# Patient Record
Sex: MALE | Race: WHITE | NOT HISPANIC OR LATINO | Employment: STUDENT | URBAN - METROPOLITAN AREA
[De-identification: names, ages, dates, MRNs, and addresses within clinical notes are randomized per-mention and may not be internally consistent; named-entity substitution may affect disease eponyms.]

---
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
## 2024-03-26 ENCOUNTER — OFFICE VISIT (OUTPATIENT)
Dept: URGENT CARE | Facility: CLINIC | Age: 19
End: 2024-03-26
Payer: COMMERCIAL

## 2024-03-26 VITALS
WEIGHT: 139 LBS | BODY MASS INDEX: 19.46 KG/M2 | OXYGEN SATURATION: 98 % | RESPIRATION RATE: 18 BRPM | DIASTOLIC BLOOD PRESSURE: 52 MMHG | SYSTOLIC BLOOD PRESSURE: 98 MMHG | HEART RATE: 107 BPM | TEMPERATURE: 102 F | HEIGHT: 71 IN

## 2024-03-26 DIAGNOSIS — B34.9 VIRAL INFECTION: Primary | ICD-10-CM

## 2024-03-26 PROCEDURE — 99213 OFFICE O/P EST LOW 20 MIN: CPT

## 2024-03-26 NOTE — PROGRESS NOTES
[facility]        NAME: Chance Lennon is a 18 y.o. male  : 2005    MRN: [id number]  DATE: 2024  TIME: 5:23 PM    Assessment and Plan   Viral infection [B34.9]  1. Viral infection              Patient Instructions       Follow up with PCP in 3-5 days.  Proceed to  ER if symptoms worsen.    If tests have been performed at [facility], our office will contact you with results if changes need to be made to the care plan discussed with you at the visit.  [facility]    Chief Complaint     Chief Complaint   Patient presents with    Fever     Pt presents with fever (peak 104), sore throat, body aches, chills, nasal congestion, and ear pressure since waking up this patsy.    Pt took tylenol today x 45 minutes ago.           History of Present Illness       19 y/o M presents with mom for cold-like symptoms x 2 days.  Patient was last night a sore throat developed.  This morning he woke up with myalgias, headache, sinus pressure, fevers and chills.  Using Tylenol as needed.  Last use 45 minutes ago.  Denies any known sick contacts.        Review of Systems   Review of Systems   Constitutional:  Positive for chills and fever.   HENT:  Positive for congestion, ear pain, rhinorrhea and sore throat.    Respiratory:  Negative for cough.    Gastrointestinal:  Negative for abdominal pain.   Neurological:  Positive for headaches.         Current Medications       Current Outpatient Medications:     amphetamine-dextroamphetamine (ADDERALL) 10 mg tablet, if needed, Disp: , Rfl:     montelukast (SINGULAIR) 10 mg tablet, Take 10 mg by mouth daily at bedtime, Disp: , Rfl:     Multiple Vitamin (MULTIVITAMIN ADULT PO), Take by mouth daily after breakfast, Disp: , Rfl:     cetirizine (ZyrTEC) 10 mg tablet, Take 10 mg by mouth daily (Patient not taking: Reported on 3/26/2024), Disp: , Rfl:     Current Allergies     Allergies as of 2024    (No Known Allergies)         "    The following portions of the patient's history were reviewed and updated as appropriate: allergies, current medications, past family history, past medical history, past social history, past surgical history and problem list.     Past Medical History:   Diagnosis Date    ADHD (attention deficit hyperactivity disorder)     Allergic rhinitis     Asthma     childhood       Past Surgical History:   Procedure Laterality Date    FRACTURE SURGERY Left     elb       No family history on file.      Medications have been verified.        Objective   BP 98/52   Pulse (!) 107   Temp (!) 102 °F (38.9 °C)   Resp 18   Ht 5' 11\" (1.803 m)   Wt 63 kg (139 lb)   SpO2 98%   BMI 19.39 kg/m²   No LMP for male patient.       Physical Exam     Physical Exam  Vitals and nursing note reviewed.   Constitutional:       General: He is not in acute distress.     Appearance: He is not toxic-appearing.   HENT:      Head: Normocephalic and atraumatic.      Right Ear: Tympanic membrane, ear canal and external ear normal.      Left Ear: Tympanic membrane, ear canal and external ear normal.      Nose: Nose normal.      Mouth/Throat:      Mouth: Mucous membranes are moist.      Pharynx: No oropharyngeal exudate or posterior oropharyngeal erythema.   Eyes:      Conjunctiva/sclera: Conjunctivae normal.   Pulmonary:      Effort: Pulmonary effort is normal.   Lymphadenopathy:      Cervical: No cervical adenopathy.   Neurological:      Mental Status: He is alert.   Psychiatric:         Mood and Affect: Mood normal.         Behavior: Behavior normal.                   "

## 2024-03-26 NOTE — LETTER
March 26, 2024     Patient: Chance Lennon   YOB: 2005   Date of Visit: 3/26/2024       To Whom it May Concern:    Chance Lennon was seen in my clinic on 3/26/2024.  He may return to school when afebrile without the use of antipyretics    If you have any questions or concerns, please don't hesitate to call.         Sincerely,          Luther Palencia PA-C        CC: No Recipients